# Patient Record
Sex: FEMALE | Race: BLACK OR AFRICAN AMERICAN | NOT HISPANIC OR LATINO | Employment: FULL TIME | URBAN - METROPOLITAN AREA
[De-identification: names, ages, dates, MRNs, and addresses within clinical notes are randomized per-mention and may not be internally consistent; named-entity substitution may affect disease eponyms.]

---

## 2020-04-05 ENCOUNTER — NURSE TRIAGE (OUTPATIENT)
Dept: OTHER | Facility: OTHER | Age: 25
End: 2020-04-05

## 2020-06-15 ENCOUNTER — HOSPITAL ENCOUNTER (EMERGENCY)
Facility: HOSPITAL | Age: 25
Discharge: HOME | End: 2020-06-15
Attending: EMERGENCY MEDICINE | Admitting: EMERGENCY MEDICINE
Payer: COMMERCIAL

## 2020-06-15 ENCOUNTER — APPOINTMENT (EMERGENCY)
Dept: RADIOLOGY | Facility: HOSPITAL | Age: 25
End: 2020-06-15
Payer: COMMERCIAL

## 2020-06-15 VITALS
BODY MASS INDEX: 30.27 KG/M2 | HEART RATE: 77 BPM | DIASTOLIC BLOOD PRESSURE: 77 MMHG | TEMPERATURE: 98.4 F | WEIGHT: 181.88 LBS | RESPIRATION RATE: 16 BRPM | SYSTOLIC BLOOD PRESSURE: 122 MMHG | OXYGEN SATURATION: 100 %

## 2020-06-15 DIAGNOSIS — S50.311A ABRASION OF RIGHT ELBOW, INITIAL ENCOUNTER: Primary | ICD-10-CM

## 2020-06-15 DIAGNOSIS — M25.521 RIGHT ELBOW PAIN: ICD-10-CM

## 2020-06-15 PROCEDURE — 90715 TDAP VACCINE 7 YRS/> IM: CPT | Performed by: EMERGENCY MEDICINE

## 2020-06-15 PROCEDURE — 90471 IMMUNIZATION ADMIN: CPT

## 2020-06-15 PROCEDURE — 99282 EMERGENCY DEPT VISIT SF MDM: CPT | Performed by: EMERGENCY MEDICINE

## 2020-06-15 PROCEDURE — 99283 EMERGENCY DEPT VISIT LOW MDM: CPT

## 2020-06-15 PROCEDURE — 73080 X-RAY EXAM OF ELBOW: CPT

## 2020-06-15 RX ORDER — IBUPROFEN 400 MG/1
400 TABLET ORAL ONCE
Status: COMPLETED | OUTPATIENT
Start: 2020-06-15 | End: 2020-06-15

## 2020-06-15 RX ADMIN — IBUPROFEN 400 MG: 400 TABLET ORAL at 18:21

## 2020-06-15 RX ADMIN — TETANUS TOXOID, REDUCED DIPHTHERIA TOXOID AND ACELLULAR PERTUSSIS VACCINE, ADSORBED 0.5 ML: 5; 2.5; 8; 8; 2.5 SUSPENSION INTRAMUSCULAR at 18:22

## 2020-07-31 ENCOUNTER — HOSPITAL ENCOUNTER (EMERGENCY)
Facility: HOSPITAL | Age: 25
Discharge: HOME | End: 2020-07-31
Attending: EMERGENCY MEDICINE | Admitting: EMERGENCY MEDICINE
Payer: COMMERCIAL

## 2020-07-31 ENCOUNTER — APPOINTMENT (EMERGENCY)
Dept: RADIOLOGY | Facility: HOSPITAL | Age: 25
End: 2020-07-31
Payer: COMMERCIAL

## 2020-07-31 VITALS
OXYGEN SATURATION: 100 % | HEART RATE: 74 BPM | DIASTOLIC BLOOD PRESSURE: 56 MMHG | SYSTOLIC BLOOD PRESSURE: 104 MMHG | WEIGHT: 180 LBS | RESPIRATION RATE: 16 BRPM | BODY MASS INDEX: 29.95 KG/M2 | TEMPERATURE: 98.8 F

## 2020-07-31 DIAGNOSIS — R30.0 DYSURIA: ICD-10-CM

## 2020-07-31 DIAGNOSIS — R33.9 URINARY RETENTION: Primary | ICD-10-CM

## 2020-07-31 LAB
ANION GAP SERPL CALCULATED.3IONS-SCNC: 7 MMOL/L (ref 4–13)
BACTERIA UR QL AUTO: ABNORMAL /HPF
BASOPHILS # BLD AUTO: 0.05 THOUSANDS/ΜL (ref 0–0.1)
BASOPHILS NFR BLD AUTO: 1 % (ref 0–1)
BILIRUB UR QL STRIP: NEGATIVE
BUN SERPL-MCNC: 12 MG/DL (ref 5–25)
CALCIUM SERPL-MCNC: 9.4 MG/DL (ref 8.3–10.1)
CHLORIDE SERPL-SCNC: 107 MMOL/L (ref 100–108)
CLARITY UR: CLEAR
CO2 SERPL-SCNC: 24 MMOL/L (ref 21–32)
COLOR UR: YELLOW
COLOR, POC: YELLOW
CREAT SERPL-MCNC: 0.64 MG/DL (ref 0.6–1.3)
EOSINOPHIL # BLD AUTO: 0.02 THOUSAND/ΜL (ref 0–0.61)
EOSINOPHIL NFR BLD AUTO: 0 % (ref 0–6)
ERYTHROCYTE [DISTWIDTH] IN BLOOD BY AUTOMATED COUNT: 14 % (ref 11.6–15.1)
EXT PREG TEST URINE: NEGATIVE
EXT. CONTROL ED NAV: NORMAL
GFR SERPL CREATININE-BSD FRML MDRD: 141 ML/MIN/1.73SQ M
GLUCOSE SERPL-MCNC: 88 MG/DL (ref 65–140)
GLUCOSE UR STRIP-MCNC: NEGATIVE MG/DL
HCT VFR BLD AUTO: 38.4 % (ref 34.8–46.1)
HGB BLD-MCNC: 12.7 G/DL (ref 11.5–15.4)
HGB UR QL STRIP.AUTO: NEGATIVE
HYALINE CASTS #/AREA URNS LPF: ABNORMAL /LPF
IMM GRANULOCYTES # BLD AUTO: 0.02 THOUSAND/UL (ref 0–0.2)
IMM GRANULOCYTES NFR BLD AUTO: 0 % (ref 0–2)
KETONES UR STRIP-MCNC: ABNORMAL MG/DL
LEUKOCYTE ESTERASE UR QL STRIP: NEGATIVE
LYMPHOCYTES # BLD AUTO: 1.98 THOUSANDS/ΜL (ref 0.6–4.47)
LYMPHOCYTES NFR BLD AUTO: 30 % (ref 14–44)
MCH RBC QN AUTO: 29.5 PG (ref 26.8–34.3)
MCHC RBC AUTO-ENTMCNC: 33.1 G/DL (ref 31.4–37.4)
MCV RBC AUTO: 89 FL (ref 82–98)
MONOCYTES # BLD AUTO: 0.7 THOUSAND/ΜL (ref 0.17–1.22)
MONOCYTES NFR BLD AUTO: 11 % (ref 4–12)
NEUTROPHILS # BLD AUTO: 3.75 THOUSANDS/ΜL (ref 1.85–7.62)
NEUTS SEG NFR BLD AUTO: 58 % (ref 43–75)
NITRITE UR QL STRIP: NEGATIVE
NON-SQ EPI CELLS URNS QL MICRO: ABNORMAL /HPF
NRBC BLD AUTO-RTO: 0 /100 WBCS
PH UR STRIP.AUTO: 6.5 [PH] (ref 4.5–8)
PLATELET # BLD AUTO: 225 THOUSANDS/UL (ref 149–390)
PMV BLD AUTO: 10.2 FL (ref 8.9–12.7)
POTASSIUM SERPL-SCNC: 3.6 MMOL/L (ref 3.5–5.3)
PROT UR STRIP-MCNC: ABNORMAL MG/DL
RBC # BLD AUTO: 4.31 MILLION/UL (ref 3.81–5.12)
RBC #/AREA URNS AUTO: ABNORMAL /HPF
SODIUM SERPL-SCNC: 138 MMOL/L (ref 136–145)
SP GR UR STRIP.AUTO: 1.02 (ref 1–1.03)
UROBILINOGEN UR QL STRIP.AUTO: 0.2 E.U./DL
WBC # BLD AUTO: 6.52 THOUSAND/UL (ref 4.31–10.16)
WBC #/AREA URNS AUTO: ABNORMAL /HPF

## 2020-07-31 PROCEDURE — 80048 BASIC METABOLIC PNL TOTAL CA: CPT | Performed by: EMERGENCY MEDICINE

## 2020-07-31 PROCEDURE — 99285 EMERGENCY DEPT VISIT HI MDM: CPT | Performed by: EMERGENCY MEDICINE

## 2020-07-31 PROCEDURE — 99284 EMERGENCY DEPT VISIT MOD MDM: CPT

## 2020-07-31 PROCEDURE — 87591 N.GONORRHOEAE DNA AMP PROB: CPT | Performed by: EMERGENCY MEDICINE

## 2020-07-31 PROCEDURE — 81001 URINALYSIS AUTO W/SCOPE: CPT

## 2020-07-31 PROCEDURE — 74176 CT ABD & PELVIS W/O CONTRAST: CPT

## 2020-07-31 PROCEDURE — 87491 CHLMYD TRACH DNA AMP PROBE: CPT | Performed by: EMERGENCY MEDICINE

## 2020-07-31 PROCEDURE — 85025 COMPLETE CBC W/AUTO DIFF WBC: CPT | Performed by: EMERGENCY MEDICINE

## 2020-07-31 PROCEDURE — 96361 HYDRATE IV INFUSION ADD-ON: CPT

## 2020-07-31 PROCEDURE — 96360 HYDRATION IV INFUSION INIT: CPT

## 2020-07-31 PROCEDURE — 76775 US EXAM ABDO BACK WALL LIM: CPT | Performed by: EMERGENCY MEDICINE

## 2020-07-31 PROCEDURE — 36415 COLL VENOUS BLD VENIPUNCTURE: CPT | Performed by: EMERGENCY MEDICINE

## 2020-07-31 PROCEDURE — 96372 THER/PROPH/DIAG INJ SC/IM: CPT

## 2020-07-31 PROCEDURE — 51798 US URINE CAPACITY MEASURE: CPT

## 2020-07-31 PROCEDURE — 81025 URINE PREGNANCY TEST: CPT | Performed by: EMERGENCY MEDICINE

## 2020-07-31 RX ORDER — AZITHROMYCIN 500 MG/1
500 TABLET, FILM COATED ORAL ONCE
Status: COMPLETED | OUTPATIENT
Start: 2020-07-31 | End: 2020-07-31

## 2020-07-31 RX ORDER — IBUPROFEN 400 MG/1
400 TABLET ORAL ONCE
Status: COMPLETED | OUTPATIENT
Start: 2020-07-31 | End: 2020-07-31

## 2020-07-31 RX ORDER — PHENAZOPYRIDINE HYDROCHLORIDE 100 MG/1
100 TABLET, FILM COATED ORAL ONCE
Status: COMPLETED | OUTPATIENT
Start: 2020-07-31 | End: 2020-07-31

## 2020-07-31 RX ORDER — ACYCLOVIR 400 MG/1
400 TABLET ORAL EVERY 8 HOURS
Qty: 30 TABLET | Refills: 0 | Status: SHIPPED | OUTPATIENT
Start: 2020-07-31 | End: 2021-07-05

## 2020-07-31 RX ORDER — PHENAZOPYRIDINE HYDROCHLORIDE 200 MG/1
200 TABLET, FILM COATED ORAL 3 TIMES DAILY
Qty: 6 TABLET | Refills: 0 | Status: SHIPPED | OUTPATIENT
Start: 2020-07-31 | End: 2021-07-05

## 2020-07-31 RX ADMIN — PHENAZOPYRIDINE HYDROCHLORIDE 100 MG: 100 TABLET ORAL at 15:49

## 2020-07-31 RX ADMIN — IBUPROFEN 400 MG: 400 TABLET ORAL at 07:31

## 2020-07-31 RX ADMIN — AZITHROMYCIN 500 MG: 500 TABLET, FILM COATED ORAL at 16:15

## 2020-07-31 RX ADMIN — SODIUM CHLORIDE 2000 ML: 0.9 INJECTION, SOLUTION INTRAVENOUS at 12:14

## 2020-07-31 RX ADMIN — AZITHROMYCIN 500 MG: 500 TABLET, FILM COATED ORAL at 15:49

## 2020-07-31 RX ADMIN — LIDOCAINE HYDROCHLORIDE 250 MG: 10 INJECTION, SOLUTION EPIDURAL; INFILTRATION; INTRACAUDAL; PERINEURAL at 15:48

## 2020-07-31 NOTE — ED NOTES
Patient unable to void  Bladder scan shows 176ml of urine in bladder       Jhony Morales RN  07/31/20 3020

## 2020-07-31 NOTE — ED ATTENDING ATTESTATION
7/31/2020  ILizette MD, saw and evaluated the patient  I have discussed the patient with the resident/non-physician practitioner and agree with the resident's/non-physician practitioner's findings, Plan of Care, and MDM as documented in the resident's/non-physician practitioner's note, except where noted  All available labs and Radiology studies were reviewed  I was present for key portions of any procedure(s) performed by the resident/non-physician practitioner and I was immediately available to provide assistance  At this point I agree with the current assessment done in the Emergency Department  I have conducted an independent evaluation of this patient a history and physical is as follows:    Patient is a 68-year-old female who presents with acute urinary retention onset this morning  Denies any back or abdominal pain at this time  States that she is unable to initiate voiding has day urge to urinate but cannot void  Neurologic exam is within normal limits  Patient initially stated that she did not have any vaginal bleeding or discharge  However upon straight cathing patient nursing noted that she did have vaginal discharge present upon perineum  Patient denies any STI exposures  A pelvic exam is performed by the resident swab sent for GC chlamydia  Attempted a foil trial emergency department was unsuccessful secondary to patient unable to void patient was re-evaluated when directly asked patient states it hurts to void  Patient was empirically covered for gonorrhea and chlamydia as well as have herpes  Patient also underwent labs urinalysis and imaging well emergency department all which were unremarkable  Patient was offered a Urrutia catheter with leg bag however declined and will follow-up with urology as an outpatient  Return precautions discussed with patient to return immediately should she not be able to void within next 4-6 hours    Should she develop fevers or should she have any other further concerns    ED Course         Critical Care Time  Procedures

## 2020-07-31 NOTE — ED PROVIDER NOTES
History  Chief Complaint   Patient presents with    Urinary Retention     Pt stated that beginning yesterday she was having trouble urinating and has now not urinated since 2300 hours 7/30/2020  Complaints of lower abdominal pain  HPI   64-year-old woman presenting with urinary retention  Over the past 4 days patient has had dysuria and sensation of difficulty emptying her bladder completely  This began with urinary hesitancy and frequency  She thought her urine initially was cloudy and possibly different smelling when symptoms began  She was able to urinate spontaneously at first but has felt like she was never completely emptying her bladder  This morning she tried to urinate and was unable to expell any urine at all  She has some suprapubic discomfort  She denies any flank or back pain  No weakness or numbness in legs or groin  She denies feeling febrile  No nausea or vomiting  She has a history of UTI, but says this feels different  No history of nephrolithiasis  Denies any vaginal bleeding or discharge  Monogamous and no concern for sexually transmitted infection  None       History reviewed  No pertinent past medical history  History reviewed  No pertinent surgical history  History reviewed  No pertinent family history  I have reviewed and agree with the history as documented  E-Cigarette/Vaping    E-Cigarette Use Never User      E-Cigarette/Vaping Substances     Social History     Tobacco Use    Smoking status: Never Smoker   Substance Use Topics    Alcohol use: No    Drug use: No        Review of Systems   Constitutional: Negative for chills and fever  Respiratory: Negative for cough and shortness of breath  Cardiovascular: Negative for chest pain  Gastrointestinal: Negative for abdominal pain, nausea and vomiting  Genitourinary: Positive for difficulty urinating, dysuria, frequency and pelvic pain   Negative for flank pain, hematuria, vaginal bleeding, vaginal discharge and vaginal pain  Musculoskeletal: Negative for arthralgias, back pain and myalgias  Skin: Negative for pallor and rash  Neurological: Negative for weakness, numbness and headaches  All other systems reviewed and are negative  Physical Exam  ED Triage Vitals   Temperature Pulse Respirations Blood Pressure SpO2   07/31/20 0622 07/31/20 0622 07/31/20 0622 07/31/20 0622 07/31/20 0622   98 8 °F (37 1 °C) 95 18 124/74 95 %      Temp Source Heart Rate Source Patient Position - Orthostatic VS BP Location FiO2 (%)   07/31/20 0622 07/31/20 1302 07/31/20 1302 07/31/20 1302 --   Oral Monitor Lying Right arm       Pain Score       07/31/20 0622       8             Orthostatic Vital Signs  Vitals:    07/31/20 1302 07/31/20 1330 07/31/20 1400 07/31/20 1530   BP: 100/62 101/56 99/58 104/56   Pulse: 65 74 64 74   Patient Position - Orthostatic VS: Lying          Physical Exam  Vitals signs and nursing note reviewed  Constitutional:       General: She is not in acute distress  Appearance: She is well-developed and well-nourished  She is not diaphoretic  HENT:      Head: Normocephalic and atraumatic  Eyes:      General: No scleral icterus  Extraocular Movements: EOM normal       Conjunctiva/sclera: Conjunctivae normal       Pupils: Pupils are equal, round, and reactive to light  Neck:      Musculoskeletal: Normal range of motion and neck supple  Cardiovascular:      Rate and Rhythm: Normal rate and regular rhythm  Heart sounds: No murmur  No friction rub  No gallop  Pulmonary:      Breath sounds: Normal breath sounds  No wheezing or rales  Abdominal:      General: There is no distension  Palpations: Abdomen is soft  Tenderness: There is no abdominal tenderness  There is no guarding or rebound  Comments: Mild suprapubic discomfort to palpation  Genitourinary:     Vagina: Vaginal discharge present  Comments: No flank tenderness to percussion    Chaperoned pelvic exam   No external or intravaginal lesions seen  Cervix appears non-inflamed  No CMT  Musculoskeletal: Normal range of motion  General: No tenderness or edema  Comments: No back pain  Skin:     General: Skin is warm and dry  Coloration: Skin is not pale  Findings: No erythema  Neurological:      Mental Status: She is alert and oriented to person, place, and time  Cranial Nerves: No cranial nerve deficit  Sensory: No sensory deficit  Motor: No abnormal muscle tone  Comments: No groin paresthesias  No numbness or weakness in legs  Normal gait  Psychiatric:         Mood and Affect: Mood and affect normal          Behavior: Behavior normal          ED Medications  Medications   ibuprofen (MOTRIN) tablet 400 mg (400 mg Oral Given 7/31/20 0731)   sodium chloride 0 9 % bolus 2,000 mL (0 mL Intravenous Stopped 7/31/20 1419)   phenazopyridine (PYRIDIUM) tablet 100 mg (100 mg Oral Given 7/31/20 1549)   cefTRIAXone (ROCEPHIN) 250 mg in lidocaine (PF) (XYLOCAINE-MPF) 1 % IM only syringe (250 mg Intramuscular Given 7/31/20 1548)   azithromycin (ZITHROMAX) tablet 500 mg (500 mg Oral Given 7/31/20 1549)   azithromycin (ZITHROMAX) tablet 500 mg (500 mg Oral Given 7/31/20 1615)       Diagnostic Studies  Results Reviewed     Procedure Component Value Units Date/Time    Chlamydia/GC amplified DNA by PCR [[id number]] Collected:  07/31/20 1553    Lab Status:   In process Specimen:  Genital from Cervix Updated:  07/31/20 1601    Urine Microscopic [[id number]]  (Abnormal) Collected:  07/31/20 0740    Lab Status:  Final result Specimen:  Urine, Other Updated:  07/31/20 0840     RBC, UA None Seen /hpf      WBC, UA 2-4 /hpf      Epithelial Cells None Seen /hpf      Bacteria, UA None Seen /hpf      Hyaline Casts, UA None Seen /lpf     Basic metabolic panel [[id number]] Collected:  07/31/20 0727    Lab Status:  Final result Specimen:  Blood from Arm, Left Updated:  07/31/20 0747     Sodium 138 mmol/L      Potassium 3 6 mmol/L      Chloride 107 mmol/L      CO2 24 mmol/L      ANION GAP 7 mmol/L      BUN 12 mg/dL      Creatinine 0 64 mg/dL      Glucose 88 mg/dL      Calcium 9 4 mg/dL      eGFR 141 ml/min/1 73sq m     Narrative:       National Kidney Disease Foundation guidelines for Chronic Kidney Disease (CKD):     Stage 1 with normal or high GFR (GFR > 90 mL/min/1 73 square meters)    Stage 2 Mild CKD (GFR = 60-89 mL/min/1 73 square meters)    Stage 3A Moderate CKD (GFR = 45-59 mL/min/1 73 square meters)    Stage 3B Moderate CKD (GFR = 30-44 mL/min/1 73 square meters)    Stage 4 Severe CKD (GFR = 15-29 mL/min/1 73 square meters)    Stage 5 End Stage CKD (GFR <15 mL/min/1 73 square meters)  Note: GFR calculation is accurate only with a steady state creatinine    POCT pregnancy, urine [[id number]]  (Normal) Resulted:  07/31/20 0740    Lab Status:  Final result Updated:  07/31/20 0740     EXT PREG TEST UR (Ref: Negative) negative     Control valid    Urine Macroscopic, POC [[id number]]  (Abnormal) Collected:  07/31/20 0740    Lab Status:  Final result Specimen:  Urine Updated:  07/31/20 0739     Color, UA Yellow     Clarity, UA Clear     pH, UA 6 5     Leukocytes, UA Negative     Nitrite, UA Negative     Protein, UA Trace mg/dl      Glucose, UA Negative mg/dl      Ketones, UA Trace mg/dl      Urobilinogen, UA 0 2 E U /dl      Bilirubin, UA Negative     Blood, UA Negative     Specific Gravity, UA 1 025    Narrative:       CLINITEK RESULT    CBC and differential [[id number]] Collected:  07/31/20 0727    Lab Status:  Final result Specimen:  Blood from Arm, Left Updated:  07/31/20 0738     WBC 6 52 Thousand/uL      RBC 4 31 Million/uL      Hemoglobin 12 7 g/dL      Hematocrit 38 4 %      MCV 89 fL      MCH 29 5 pg      MCHC 33 1 g/dL      RDW 14 0 %      MPV 10 2 fL      Platelets 611 Thousands/uL      nRBC 0 /100 WBCs      Neutrophils Relative 58 %      Immat GRANS % 0 %      Lymphocytes Relative 30 % Monocytes Relative 11 %      Eosinophils Relative 0 %      Basophils Relative 1 %      Neutrophils Absolute 3 75 Thousands/µL      Immature Grans Absolute 0 02 Thousand/uL      Lymphocytes Absolute 1 98 Thousands/µL      Monocytes Absolute 0 70 Thousand/µL      Eosinophils Absolute 0 02 Thousand/µL      Basophils Absolute 0 05 Thousands/µL     POCT urinalysis dipstick [[id number]]  (Normal) Resulted:  07/31/20 0727    Lab Status:  Final result Specimen:  Urine Updated:  07/31/20 0727     Color, UA yellow                 CT renal stone study abdomen pelvis wo contrast   Final Result by Elmo Jay MD (07/31 8810)      No evidence for hydronephrosis or obstructive uropathy  Symmetric high attenuation within the renal prominence bilaterally without evidence for obstructive uropathy  Finding can be seen in the setting of increased urine specific gravity and/or dehydration  No definite evidence for nephrolithiasis or focal    calcification to suggest presence of underlying medullary nephrocalcinosis on the current exam       Appendicolith within the appendiceal tip  No definite right lower quadrant inflammatory changes on the current exam       Small fat-containing umbilical hernia               Workstation performed: [id number]               Procedures  POC Renal US  Date/Time: 7/31/2020 7:07 AM  Performed by: Chidi Morrow MD  Authorized by: Chidi Morrow MD     Patient location:  ED  Performed by:  Resident  Other Assisting Provider: Yes (comment) Obi Lincoln MD)    Procedure details:     Exam Type:  Diagnostic    Indications: urinary retention      Assessment for:  Bladder volume and suspected hydronephrosis    Views obtained: bladder (transverse and sagittal), left kidney and right kidney      Image quality: diagnostic      Image availability:  Images available in PACS and still images obtained  Findings:     LEFT hydronephrosis: mild (grade 1)      RIGHT hydronephrosis: none      Bladder: Visualized    Bladder findings: distended bladder    Interpretation:     Renal ultrasound impressions: hydronephrosis            ED Course  ED Course as of Aug 02 0857   Fri Jul 31, 2020   0759 PREGNANCY TEST URINE: negative   0759 WBC: 6 52   0759 Creatinine: 0 64   0759 Leukocytes, UA: Negative   0759 Nitrite, UA: Negative   0759 Patient's labs show no leukocytosis, no acute kidney injury, urine appears clean on macro  CT renal stone study is pending  0914 WBC, UA(!): 2-4       US AUDIT      Most Recent Value   Initial Alcohol Screen: US AUDIT-C    1  How often do you have a drink containing alcohol? 2 Filed at: 07/31/2020 0731   2  How many drinks containing alcohol do you have on a typical day you are drinking? 0 Filed at: 07/31/2020 0731   3a  Male UNDER 65: How often do you have five or more drinks on one occasion? 0 Filed at: 07/31/2020 0731   3b  FEMALE Any Age, or MALE 65+: How often do you have 4 or more drinks on one occassion? 0 Filed at: 07/31/2020 0731   Audit-C Score  2 Filed at: 07/31/2020 5175        MDM  Number of Diagnoses or Management Options  Dysuria: new and requires workup  Urinary retention: new and requires workup     Amount and/or Complexity of Data Reviewed  Clinical lab tests: ordered and reviewed  Tests in the radiology section of CPT®: ordered and reviewed  Tests in the medicine section of CPT®: ordered and reviewed  Discussion of test results with the performing providers: yes  Independent visualization of images, tracings, or specimens: yes    Patient Progress  Patient progress: improved    73-year-old woman here with dysuria and urinary retention  Patient is well-appearing with normal vital signs  She does have some suprapubic distention with prevoid bladder volume greater than 380 mL with bladder scanner  On bedside ultrasound patient appears to have some mild hydronephrosis of the left kidney  The bladder is full  Will check urinalysis, straight cath if necessary  Given hydronephrosis on bedside ultrasound will do CT stone study to evaluate for nephrolithiasis that could be causing obstruction  Will check BMP for renal function  Will provide analgesia with NSAIDs  Patient's urine is clean  Her renal function is normal   No nephrolithiasis or hydronephrosis seen on CT abdomen/pelvis  The bladder was completely voided with straight catheterization  The patient was only able to void spontaneously a very small amount of urine afterward despite 2 L IV fluids  Postvoid residual was greater than 150 mL  Patient denies any vaginal discharge or concern for sexually transmitted infection, but given no alternative explanation for her urinary retention I recommended pelvic exam for screening for sexually transmitted infection since I am suspicious her urinary retention is related to urethral pain  On exam the patient does have a moderate amount of whitish gray discharge  Will send for gonorrhea and chlamydia testing and treat empirically  Her partner will also get testing  Herpes simplex virus could also cause patient's dysuria  No external lesions of herpes were seen on exam, but will treat empirically with 10 day course of acyclovir  Will provide patient with Pyridium for dysuria  Initial plan was to discharge patient with Urrutia catheter in place, but patient was unable to tolerate having the catheter in because of discomfort  Offered patient admission to the hospital for intermittent straight catheterization and urology evaluation, but she would like to be discharged with the understanding that she may need to return to the hospital if she is unable to void spontaneously at home  I discussed the patient with urology KARLOS Roque, who will notify Urology Clinic of patient for follow-up      Disposition  Final diagnoses:   Urinary retention   Dysuria     Time reflects when diagnosis was documented in both MDM as applicable and the Disposition within this note     Time User Action Codes Description Comment    7/31/2020  3:59 PM Bettyjane Celia Add [R33 9] Urinary retention     7/31/2020  4:07 PM Bettyjane Celia Add [R30 0] Dysuria       ED Disposition     ED Disposition Condition Date/Time Comment    Discharge Fair Fri Jul 31, 2020  3:59 PM Liliana Bahena discharge to home/self care  Follow-up Information     Follow up With Specialties Details Why Contact Info Additional [address] [facility] Call  For follow-up appointment [address] [facility] [id number]  704  [facility] [place], [address] [place] [address], [address]    [facility] Emergency Medicine Go to  If symptoms worsen [address]  [phone]  ED, [address] [place] [address], [address]   [phone]          Discharge Medication List as of 7/31/2020  4:12 PM      START taking these medications    Details   acyclovir (ZOVIRAX) 400 MG tablet Take 1 tablet (400 mg total) by mouth every 8 (eight) hours for 10 days, Starting Fri 7/31/2020, Until Mon 8/10/2020, Print      phenazopyridine (PYRIDIUM) 200 mg tablet Take 1 tablet (200 mg total) by mouth 3 (three) times a day, Starting Fri 7/31/2020, Print           No discharge procedures on file  PDMP Review     None           ED Provider  Attending physically available and evaluated Liliana Bahena I managed the patient along with the ED Attending      Electronically Signed by         Charity Dos Santos MD  08/02/20 95 Lupe Perez MD  08/02/20 95 Lupe Perez MD  08/02/20 3776

## 2020-08-04 LAB
C TRACH DNA SPEC QL NAA+PROBE: NEGATIVE
N GONORRHOEA DNA SPEC QL NAA+PROBE: NEGATIVE

## 2020-09-30 ENCOUNTER — NURSE TRIAGE (OUTPATIENT)
Dept: OTHER | Facility: OTHER | Age: 25
End: 2020-09-30

## 2020-09-30 DIAGNOSIS — Z20.822 COVID-19 RULED OUT: Primary | ICD-10-CM

## 2020-10-01 DIAGNOSIS — Z20.822 COVID-19 RULED OUT: ICD-10-CM

## 2020-10-01 PROCEDURE — U0003 INFECTIOUS AGENT DETECTION BY NUCLEIC ACID (DNA OR RNA); SEVERE ACUTE RESPIRATORY SYNDROME CORONAVIRUS 2 (SARS-COV-2) (CORONAVIRUS DISEASE [COVID-19]), AMPLIFIED PROBE TECHNIQUE, MAKING USE OF HIGH THROUGHPUT TECHNOLOGIES AS DESCRIBED BY CMS-2020-01-R: HCPCS | Performed by: FAMILY MEDICINE

## 2020-10-01 NOTE — TELEPHONE ENCOUNTER
Regarding: COVID-19 test  ----- Message from Aramis Etienne sent at 9/30/2020  9:44 PM EDT -----  My temperature is 100 0 (Oral), stuffy and runny nose, weak and sore throat  I will like to get tested for COVID-19

## 2020-10-01 NOTE — TELEPHONE ENCOUNTER
Reason for Disposition   COVID-19 Testing, questions about    Answer Assessment - Initial Assessment Questions  1  COVID-19 DIAGNOSIS: "Who made your Coronavirus (COVID-19) diagnosis?" "Was it confirmed by a positive lab test?" If not diagnosed by a HCP, ask "Are there lots of cases (community spread) where you live?" (See public health department website, if unsure)    * MAJOR community spread: high number of cases; numbers of cases are increasing; many people hospitalized  * MINOR community spread: low number of cases; not increasing; few or no people hospitalized      Sunday at movies  2  ONSET: "When did the COVID-19 symptoms start?"       Tuesday  3  WORST SYMPTOM: "What is your worst symptom?" (e g , cough, fever, shortness of breath, muscle aches)      Worse  4  COUGH: "Do you have a cough?" If so, ask: "How bad is the cough?"        Cough  5  FEVER: "Do you have a fever?" If so, ask: "What is your temperature, how was it measured, and when did it start?"      100 0  6  RESPIRATORY STATUS: "Describe your breathing?" (e g , shortness of breath, wheezing, unable to speak)       SOB  7  BETTER-SAME-WORSE: "Are you getting better, staying the same or getting worse compared to yesterday?"  If getting worse, ask, "In what way?"      Worse  8  HIGH RISK DISEASE: "Do you have any chronic medical problems?" (e g , asthma, heart or lung disease, weak immune system, etc )      No  9  PREGNANCY: "Is there any chance you are pregnant?" "When was your last menstrual period?"      Sunday  10   OTHER SYMPTOMS: "Do you have any other symptoms?"  (e g , runny nose, headache, sore throat, loss of smell)        Weakness, sore throat    Protocols used: CORONAVIRUS (COVID-19) - DIAGNOSED OR SUSPECTED-ADULT-

## 2020-10-03 LAB — SARS-COV-2 RNA SPEC QL NAA+PROBE: NOT DETECTED

## 2021-07-05 ENCOUNTER — HOSPITAL ENCOUNTER (EMERGENCY)
Facility: HOSPITAL | Age: 26
Discharge: HOME | End: 2021-07-05
Attending: EMERGENCY MEDICINE
Payer: COMMERCIAL

## 2021-07-05 VITALS
TEMPERATURE: 98.2 F | HEART RATE: 78 BPM | SYSTOLIC BLOOD PRESSURE: 107 MMHG | DIASTOLIC BLOOD PRESSURE: 66 MMHG | WEIGHT: 180.34 LBS | RESPIRATION RATE: 16 BRPM | BODY MASS INDEX: 30.01 KG/M2 | OXYGEN SATURATION: 100 %

## 2021-07-05 DIAGNOSIS — M62.830 MUSCLE SPASM OF BACK: Primary | ICD-10-CM

## 2021-07-05 PROCEDURE — 99284 EMERGENCY DEPT VISIT MOD MDM: CPT | Performed by: EMERGENCY MEDICINE

## 2021-07-05 PROCEDURE — 99283 EMERGENCY DEPT VISIT LOW MDM: CPT

## 2021-07-05 PROCEDURE — 96372 THER/PROPH/DIAG INJ SC/IM: CPT

## 2021-07-05 RX ORDER — IBUPROFEN 600 MG/1
600 TABLET ORAL EVERY 6 HOURS PRN
Qty: 60 TABLET | Refills: 0 | Status: SHIPPED | OUTPATIENT
Start: 2021-07-05 | End: 2022-07-01

## 2021-07-05 RX ORDER — LIDOCAINE 50 MG/G
1 PATCH TOPICAL ONCE
Status: DISCONTINUED | OUTPATIENT
Start: 2021-07-05 | End: 2021-07-05 | Stop reason: HOSPADM

## 2021-07-05 RX ORDER — KETOROLAC TROMETHAMINE 30 MG/ML
15 INJECTION, SOLUTION INTRAMUSCULAR; INTRAVENOUS ONCE
Status: COMPLETED | OUTPATIENT
Start: 2021-07-05 | End: 2021-07-05

## 2021-07-05 RX ORDER — DIAZEPAM 5 MG/ML
5 INJECTION, SOLUTION INTRAMUSCULAR; INTRAVENOUS ONCE
Status: COMPLETED | OUTPATIENT
Start: 2021-07-05 | End: 2021-07-05

## 2021-07-05 RX ORDER — METHOCARBAMOL 750 MG/1
1500 TABLET, FILM COATED ORAL EVERY 8 HOURS SCHEDULED
Qty: 42 TABLET | Refills: 0 | Status: SHIPPED | OUTPATIENT
Start: 2021-07-05 | End: 2021-07-12

## 2021-07-05 RX ADMIN — LIDOCAINE 1 PATCH: 50 PATCH CUTANEOUS at 19:49

## 2021-07-05 RX ADMIN — KETOROLAC TROMETHAMINE 15 MG: 30 INJECTION, SOLUTION INTRAMUSCULAR; INTRAVENOUS at 19:49

## 2021-07-05 RX ADMIN — DIAZEPAM 5 MG: 10 INJECTION, SOLUTION INTRAMUSCULAR; INTRAVENOUS at 19:49

## 2021-07-05 NOTE — ED PROVIDER NOTES
History  Chief Complaint   Patient presents with    Back Pain     Patient states the pain started yesterday she was picking something up and felt a pain in lower left back  Patient says the pain subsided but when she tried turning today she was unable due to pain  Pt is a 30year old female presenting with left back pain x 1 day  Pt states that she had noticed left thoracolumbar back pain yesterday at work, but the pain was manageable  States today she had sudden worsening of the left back and was not able to move due to stiffness  Pt states now she has constant pain which is exacerbated by twisting and movement  She has not taken any medications for the pain  No prior back injuries  Denies fevers, loss of bowel or bladder, saddle anesthesia, IV drug abuse  History provided by:  Patient   used: No    Back Pain  Location:  Thoracic spine  Radiates to:  Does not radiate  Pain severity:  Severe  Pain is:  Same all the time  Onset quality:  Gradual  Duration:  1 day  Timing:  Constant  Progression:  Worsening  Chronicity:  New  Context: occupational injury    Relieved by:  Nothing  Worsened by:  Palpation, twisting, bending and movement  Ineffective treatments:  None tried  Risk factors: no hx of cancer, not pregnant, no recent surgery, no steroid use and no vascular disease        Prior to Admission Medications   Prescriptions Last Dose Informant Patient Reported? Taking?   acyclovir (ZOVIRAX) 400 MG tablet   No No   Sig: Take 1 tablet (400 mg total) by mouth every 8 (eight) hours for 10 days   phenazopyridine (PYRIDIUM) 200 mg tablet   No No   Sig: Take 1 tablet (200 mg total) by mouth 3 (three) times a day      Facility-Administered Medications: None       History reviewed  No pertinent past medical history  History reviewed  No pertinent surgical history  History reviewed  No pertinent family history    I have reviewed and agree with the history as documented  E-Cigarette/Vaping    E-Cigarette Use Never User      E-Cigarette/Vaping Substances     Social History     Tobacco Use    Smoking status: Never Smoker    Smokeless tobacco: Never Used   Vaping Use    Vaping Use: Never used   Substance Use Topics    Alcohol use: Yes     Comment: occassionally    Drug use: No       Review of Systems   Constitutional: Negative  HENT: Negative  Respiratory: Negative  Cardiovascular: Negative  Gastrointestinal: Negative  Genitourinary: Negative  Musculoskeletal: Positive for back pain  Neurological: Negative  All other systems reviewed and are negative  Physical Exam  Physical Exam  Constitutional:       General: She is not in acute distress  Appearance: She is well-developed  She is not diaphoretic  HENT:      Head: Normocephalic and atraumatic  Right Ear: External ear normal       Left Ear: External ear normal       Nose: Nose normal    Eyes:      General: No scleral icterus  Right eye: No discharge  Left eye: No discharge  Extraocular Movements: Extraocular movements intact  Conjunctiva/sclera: Conjunctivae normal    Cardiovascular:      Rate and Rhythm: Normal rate and regular rhythm  Pulses:           Dorsalis pedis pulses are 2+ on the right side and 2+ on the left side  Heart sounds: Normal heart sounds  Pulmonary:      Effort: Pulmonary effort is normal       Breath sounds: Normal breath sounds  Musculoskeletal:      Cervical back: Normal, normal range of motion and neck supple  Thoracic back: Spasms and tenderness present  No swelling, edema, deformity, signs of trauma, lacerations or bony tenderness  Decreased range of motion  No scoliosis        Lumbar back: Normal         Back:       Right hip: Normal       Left hip: Normal       Right knee: Normal       Left knee: Normal       Right ankle: Normal       Left ankle: Normal       Comments: LE strength 5/5 b/l and neurovascularly in tact distally on exam    Skin:     General: Skin is warm and dry  Neurological:      General: No focal deficit present  Mental Status: She is alert and oriented to person, place, and time  Mental status is at baseline  Psychiatric:         Mood and Affect: Mood normal          Behavior: Behavior normal          Vital Signs  ED Triage Vitals [07/05/21 1837]   Temperature Pulse Respirations Blood Pressure SpO2   98 2 °F (36 8 °C) 78 16 107/66 100 %      Temp Source Heart Rate Source Patient Position - Orthostatic VS BP Location FiO2 (%)   Oral Monitor -- -- --      Pain Score       8           Vitals:    07/05/21 1837   BP: 107/66   Pulse: 78         Visual Acuity      ED Medications  Medications   lidocaine (LIDODERM) 5 % patch 1 patch (1 patch Topical Medication Applied 7/5/21 1949)   diazepam (VALIUM) injection 5 mg (5 mg Intramuscular Given 7/5/21 1949)   ketorolac (TORADOL) injection 15 mg (15 mg Intramuscular Given 7/5/21 1949)       Diagnostic Studies  Results Reviewed     None                 No orders to display              Procedures  Procedures         ED Course                                           MDM  Number of Diagnoses or Management Options  Muscle spasm of back: new and does not require workup  Risk of Complications, Morbidity, and/or Mortality  Presenting problems: high  Management options: high  General comments: Given IM Valium in the Ed for symptoms  Patient Progress  Patient progress: stable      Disposition  Final diagnoses:   Muscle spasm of back     Time reflects when diagnosis was documented in both MDM as applicable and the Disposition within this note     Time User Action Codes Description Comment    7/5/2021  7:41 PM De Reason Add [P49 201] Muscle spasm of back       ED Disposition     ED Disposition Condition Date/Time Comment    Discharge Good Mon Jul 5, 2021  7:41 PM Kunal Flores discharge to home/self care              Follow-up Information Follow up With Specialties Details Why Contact Info Additional [address] [facility] Schedule an appointment as soon as possible for a visit  As needed [address], [address] [facility] [address]  [address], [address], [address] [address], [address]          Patient's Medications   Discharge Prescriptions    DICLOFENAC SODIUM (VOLTAREN) 1 %    Apply 2 g topically 4 (four) times a day       Start Date: 7/5/2021  End Date: --       Order Dose: 2 g       Quantity: 350 g    Refills: 0    IBUPROFEN (MOTRIN) 600 MG TABLET    Take 1 tablet (600 mg total) by mouth every 6 (six) hours as needed for mild pain       Start Date: 7/5/2021  End Date: --       Order Dose: 600 mg       Quantity: 60 tablet    Refills: 0    METHOCARBAMOL (ROBAXIN) 750 MG TABLET    Take 2 tablets (1,500 mg total) by mouth every 8 (eight) hours for 7 days       Start Date: 7/5/2021  End Date: 7/12/2021       Order Dose: 1,500 mg       Quantity: 42 tablet    Refills: 0     No discharge procedures on file      PDMP Review     None          ED Provider  Electronically Signed by           Vero Scott PA-C  07/05/21 1953

## 2021-07-05 NOTE — Clinical Note
Jodie Schuler was seen and treated in our emergency department on 7/5/2021  Diagnosis:     Alisson    She may return on this date: 07/08/2021         If you have any questions or concerns, please don't hesitate to call        Siena Cruz PA-C    ______________________________           _______________          _______________  Hospital Representative                              Date                                Time

## 2021-07-05 NOTE — Clinical Note
Sophia Cabezas was seen and treated in our emergency department on 7/5/2021  Diagnosis:     Alisson    She may return on this date: 07/08/2021         If you have any questions or concerns, please don't hesitate to call        Imani Garcia PA-C    ______________________________           _______________          _______________  Hospital Representative                              Date                                Time

## 2022-06-23 ENCOUNTER — APPOINTMENT (EMERGENCY)
Dept: RADIOLOGY | Facility: HOSPITAL | Age: 27
End: 2022-06-23
Payer: COMMERCIAL

## 2022-06-23 ENCOUNTER — HOSPITAL ENCOUNTER (EMERGENCY)
Facility: HOSPITAL | Age: 27
Discharge: HOME | End: 2022-06-23
Attending: EMERGENCY MEDICINE | Admitting: EMERGENCY MEDICINE
Payer: COMMERCIAL

## 2022-06-23 VITALS
HEART RATE: 74 BPM | TEMPERATURE: 98.7 F | SYSTOLIC BLOOD PRESSURE: 116 MMHG | OXYGEN SATURATION: 99 % | RESPIRATION RATE: 17 BRPM | BODY MASS INDEX: 30.01 KG/M2 | WEIGHT: 180.34 LBS | DIASTOLIC BLOOD PRESSURE: 79 MMHG
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
## 2022-06-23 DIAGNOSIS — M79.642 LEFT HAND PAIN: ICD-10-CM

## 2022-06-23 LAB
EXT PREG TEST URINE: NEGATIVE
EXT. CONTROL ED NAV: NORMAL

## 2022-06-23 PROCEDURE — 96372 THER/PROPH/DIAG INJ SC/IM: CPT

## 2022-06-23 PROCEDURE — 81025 URINE PREGNANCY TEST: CPT

## 2022-06-23 PROCEDURE — 99284 EMERGENCY DEPT VISIT MOD MDM: CPT

## 2022-06-23 PROCEDURE — 73130 X-RAY EXAM OF HAND: CPT

## 2022-06-23 RX ORDER — ACETAMINOPHEN 325 MG/1
650 TABLET ORAL EVERY 6 HOURS PRN
Qty: 30 TABLET | Refills: 0 | Status: SHIPPED | OUTPATIENT
Start: 2022-06-23

## 2022-06-23 RX ORDER — KETOROLAC TROMETHAMINE 30 MG/ML
30 INJECTION, SOLUTION INTRAMUSCULAR; INTRAVENOUS ONCE
Status: COMPLETED | OUTPATIENT
Start: 2022-06-23 | End: 2022-06-23

## 2022-06-23 RX ORDER — ACETAMINOPHEN 325 MG/1
975 TABLET ORAL ONCE
Status: COMPLETED | OUTPATIENT
Start: 2022-06-23 | End: 2022-06-23

## 2022-06-23 RX ORDER — IBUPROFEN 600 MG/1
600 TABLET ORAL EVERY 6 HOURS PRN
Qty: 30 TABLET | Refills: 0 | Status: SHIPPED | OUTPATIENT
Start: 2022-06-23 | End: 2022-07-01

## 2022-06-23 RX ADMIN — KETOROLAC TROMETHAMINE 30 MG: 30 INJECTION, SOLUTION INTRAMUSCULAR; INTRAVENOUS at 17:24

## 2022-06-23 RX ADMIN — ACETAMINOPHEN 325MG 975 MG: 325 TABLET ORAL at 17:17
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
## 2022-06-23 NOTE — Clinical Note
Republicron Blanco was seen and treated in our emergency department on 6/23/2022  Light Duty until cleared by primary care doctor or occupational medicine    Diagnosis:     Joe Samuel    She may return on this date: 06/27/2022         If you have any questions or concerns, please don't hesitate to call        Steph Eric    ______________________________           _______________          _______________  Hospital Representative                              Date                                Time

## 2022-06-25 ENCOUNTER — HOSPITAL ENCOUNTER (EMERGENCY)
Facility: HOSPITAL | Age: 27
Discharge: HOME | End: 2022-06-25
Attending: EMERGENCY MEDICINE | Admitting: EMERGENCY MEDICINE
Payer: COMMERCIAL

## 2022-06-25 ENCOUNTER — APPOINTMENT (EMERGENCY)
Dept: RADIOLOGY | Facility: HOSPITAL | Age: 27
End: 2022-06-25
Payer: COMMERCIAL

## 2022-06-25 VITALS
DIASTOLIC BLOOD PRESSURE: 80 MMHG | HEART RATE: 70 BPM | BODY MASS INDEX: 29.16 KG/M2 | SYSTOLIC BLOOD PRESSURE: 121 MMHG | WEIGHT: 181.44 LBS | OXYGEN SATURATION: 100 % | TEMPERATURE: 98.5 F | RESPIRATION RATE: 16 BRPM | HEIGHT: 66 IN

## 2022-06-25 DIAGNOSIS — S20.212A CONTUSION OF RIB ON LEFT SIDE, INITIAL ENCOUNTER: Primary | ICD-10-CM

## 2022-06-25 LAB
BILIRUB UR QL STRIP: NEGATIVE
CLARITY UR: CLEAR
COLOR UR: YELLOW
EXT PREG TEST URINE: NEGATIVE
EXT. CONTROL ED NAV: NORMAL
GLUCOSE UR STRIP-MCNC: NEGATIVE MG/DL
HGB UR QL STRIP.AUTO: NEGATIVE
KETONES UR STRIP-MCNC: NEGATIVE MG/DL
LEUKOCYTE ESTERASE UR QL STRIP: NEGATIVE
NITRITE UR QL STRIP: NEGATIVE
PH UR STRIP.AUTO: 8.5 [PH] (ref 4.5–8)
PROT UR STRIP-MCNC: NEGATIVE MG/DL
SP GR UR STRIP.AUTO: 1.02 (ref 1–1.03)
UROBILINOGEN UR QL STRIP.AUTO: 0.2 E.U./DL

## 2022-06-25 PROCEDURE — 99285 EMERGENCY DEPT VISIT HI MDM: CPT | Performed by: PHYSICIAN ASSISTANT

## 2022-06-25 PROCEDURE — 96372 THER/PROPH/DIAG INJ SC/IM: CPT

## 2022-06-25 PROCEDURE — 81003 URINALYSIS AUTO W/O SCOPE: CPT

## 2022-06-25 PROCEDURE — 81025 URINE PREGNANCY TEST: CPT | Performed by: PHYSICIAN ASSISTANT

## 2022-06-25 PROCEDURE — 93005 ELECTROCARDIOGRAM TRACING: CPT

## 2022-06-25 PROCEDURE — 71101 X-RAY EXAM UNILAT RIBS/CHEST: CPT

## 2022-06-25 PROCEDURE — 99284 EMERGENCY DEPT VISIT MOD MDM: CPT

## 2022-06-25 RX ORDER — KETOROLAC TROMETHAMINE 30 MG/ML
15 INJECTION, SOLUTION INTRAMUSCULAR; INTRAVENOUS ONCE
Status: COMPLETED | OUTPATIENT
Start: 2022-06-25 | End: 2022-06-25

## 2022-06-25 RX ORDER — LIDOCAINE 40 MG/G
CREAM TOPICAL AS NEEDED
Qty: 30 G | Refills: 0 | Status: SHIPPED | OUTPATIENT
Start: 2022-06-25

## 2022-06-25 RX ADMIN — KETOROLAC TROMETHAMINE 15 MG: 30 INJECTION, SOLUTION INTRAMUSCULAR; INTRAVENOUS at 22:18

## 2022-06-26 LAB
ATRIAL RATE: 63 BPM
P AXIS: 49 DEGREES
PR INTERVAL: 162 MS
QRS AXIS: 68 DEGREES
QRSD INTERVAL: 84 MS
QT INTERVAL: 390 MS
QTC INTERVAL: 399 MS
T WAVE AXIS: 41 DEGREES
VENTRICULAR RATE: 63 BPM

## 2022-06-26 PROCEDURE — 93010 ELECTROCARDIOGRAM REPORT: CPT | Performed by: INTERNAL MEDICINE
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
## 2022-06-26 NOTE — DISCHARGE INSTRUCTIONS
Continue the tylenol and ibuprofen as directed  You may also use the cream for pain  Follow up with your doctor  Return to the ED for worsening symptoms

## 2022-06-28 ENCOUNTER — TELEPHONE (OUTPATIENT)
Dept: OTHER | Facility: OTHER | Age: 27
End: 2022-06-28

## 2022-06-28 NOTE — TELEPHONE ENCOUNTER
Patient called and is asking if the office can give her a call regarding her upcoming appointment with the office, patient is asking if she can get a soon date for her appointment

## 2022-07-01 ENCOUNTER — OFFICE VISIT (OUTPATIENT)
Dept: FAMILY MEDICINE CLINIC | Facility: CLINIC | Age: 27
End: 2022-07-01

## 2022-07-01 VITALS
HEIGHT: 66 IN | SYSTOLIC BLOOD PRESSURE: 118 MMHG | TEMPERATURE: 97.9 F | WEIGHT: 181.5 LBS | DIASTOLIC BLOOD PRESSURE: 82 MMHG | BODY MASS INDEX: 29.17 KG/M2
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
## 2022-07-01 DIAGNOSIS — M79.642 HAND PAIN, LEFT: ICD-10-CM

## 2022-07-01 DIAGNOSIS — M25.522 LEFT ELBOW PAIN: ICD-10-CM

## 2022-07-01 DIAGNOSIS — S56.912D FOREARM STRAIN, LEFT, SUBSEQUENT ENCOUNTER: ICD-10-CM
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
## 2022-07-01 PROCEDURE — 99203 OFFICE O/P NEW LOW 30 MIN: CPT | Performed by: FAMILY MEDICINE

## 2022-07-01 RX ORDER — PREDNISONE 10 MG/1
TABLET ORAL
Qty: 18 TABLET | Refills: 0 | Status: SHIPPED | OUTPATIENT
Start: 2022-07-01

## 2022-07-01 NOTE — PATIENT INSTRUCTIONS
Elbow Sprain   AMBULATORY CARE:   An elbow sprain  is caused by a stretched or torn ligament in the elbow joint  Ligaments are the strong tissues that connect bones  Common symptoms include the following:   Bruising or changes in skin color    Decreased arm movement    Pain and stiffness, especially with movement    Swelling and tenderness    Seek care immediately if:   The skin of your injured arm looks bluish or pale (less color than normal)  You have new or increased numbness in your injured arm  Call your doctor if:   You have increased swelling and pain in your elbow  You have new or increased stiffness or trouble moving your injured arm  You have questions or concerns about your condition or care  Treatment for an elbow sprain  may include a support device, such as a brace, sling, or splint  These devices limit movement and protect your joint  Treatment may also include pain medicine, physical therapy, or surgery if the ligament does not heal  Ask how to take pain medicine safely  Care for an elbow sprain:   Rest  your elbow for 1 to 2 days after your injury  This will help decrease the risk of more damage to your elbow  Avoid activities that cause pain  Return to normal activities as directed  Apply ice  on your elbow for 15 to 20 minutes every hour or as directed  Use an ice pack, or put crushed ice in a plastic bag  Cover it with a towel  Ice helps prevent tissue damage and decreases swelling and pain  Use an elastic bandage  to support your elbow and decrease swelling so it can heal  The elastic bandage should be snug but not tight  Follow instructions about how to apply your bandage  Elevate  your elbow above the level of your heart as often as you can  This will help decrease swelling and pain  Prop your arm on pillows or blankets to keep it elevated comfortably  Exercise  your elbow as directed to decrease stiffness and improve strength   You may be directed to exercise once you are able to move your arm without pain  Prevent another elbow sprain:   Make sure you warm up and stretch before you exercise  Do not exercise when you feel pain or you are tired  Wear equipment to protect yourself when you play sports  Stop exercising and playing sports if your symptoms from a past injury return  Follow up with your doctor as directed:  Write down your questions so you remember to ask them during your visits  © Copyright "[employer] 2022 Information is for End User's use only and may not be sold, redistributed or otherwise used for commercial purposes  All illustrations and images included in CareNotes® are the copyrighted property of [employer]  or [facility] Jolly Ng   The above information is an  only  It is not intended as medical advice for individual conditions or treatments  Talk to your doctor, nurse or pharmacist before following any medical regimen to see if it is safe and effective for you
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
## 2023-04-25 ENCOUNTER — HOSPITAL ENCOUNTER (EMERGENCY)
Facility: HOSPITAL | Age: 28
Discharge: HOME | End: 2023-04-25
Attending: EMERGENCY MEDICINE

## 2023-04-25 VITALS
WEIGHT: 176.59 LBS | HEART RATE: 66 BPM | DIASTOLIC BLOOD PRESSURE: 75 MMHG | OXYGEN SATURATION: 100 % | BODY MASS INDEX: 28.5 KG/M2 | RESPIRATION RATE: 18 BRPM | TEMPERATURE: 98 F | SYSTOLIC BLOOD PRESSURE: 129 MMHG

## 2023-04-25 DIAGNOSIS — M54.50 ACUTE LEFT-SIDED LOW BACK PAIN WITHOUT SCIATICA: Primary | ICD-10-CM

## 2023-04-25 RX ORDER — METHOCARBAMOL 500 MG/1
500 TABLET, FILM COATED ORAL 2 TIMES DAILY
Qty: 20 TABLET | Refills: 0 | Status: SHIPPED | OUTPATIENT
Start: 2023-04-25

## 2023-04-25 RX ORDER — DIAZEPAM 5 MG/1
5 TABLET ORAL ONCE
Status: COMPLETED | OUTPATIENT
Start: 2023-04-25 | End: 2023-04-25

## 2023-04-25 RX ORDER — LIDOCAINE 50 MG/G
1 PATCH TOPICAL ONCE
Status: DISCONTINUED | OUTPATIENT
Start: 2023-04-25 | End: 2023-04-25 | Stop reason: HOSPADM

## 2023-04-25 RX ORDER — ACETAMINOPHEN 500 MG
500 TABLET ORAL EVERY 6 HOURS PRN
Qty: 30 TABLET | Refills: 0 | Status: SHIPPED | OUTPATIENT
Start: 2023-04-25

## 2023-04-25 RX ORDER — IBUPROFEN 400 MG/1
400 TABLET ORAL EVERY 6 HOURS PRN
Qty: 30 TABLET | Refills: 0 | Status: SHIPPED | OUTPATIENT
Start: 2023-04-25

## 2023-04-25 RX ORDER — ACETAMINOPHEN 325 MG/1
650 TABLET ORAL ONCE
Status: COMPLETED | OUTPATIENT
Start: 2023-04-25 | End: 2023-04-25

## 2023-04-25 RX ORDER — KETOROLAC TROMETHAMINE 30 MG/ML
15 INJECTION, SOLUTION INTRAMUSCULAR; INTRAVENOUS ONCE
Status: COMPLETED | OUTPATIENT
Start: 2023-04-25 | End: 2023-04-25

## 2023-04-25 RX ADMIN — KETOROLAC TROMETHAMINE 15 MG: 30 INJECTION, SOLUTION INTRAMUSCULAR; INTRAVENOUS at 16:12

## 2023-04-25 RX ADMIN — LIDOCAINE 1 PATCH: 700 PATCH TOPICAL at 16:16

## 2023-04-25 RX ADMIN — DIAZEPAM 5 MG: 5 TABLET ORAL at 16:13

## 2023-04-25 RX ADMIN — ACETAMINOPHEN 325MG 650 MG: 325 TABLET ORAL at 16:12

## 2023-04-25 NOTE — Clinical Note
Claudia Colindres was seen and treated in our emergency department on 4/25/2023  Diagnosis:     Mili Moe  may return to work on return date  She may return on this date: 04/27/2023         If you have any questions or concerns, please don't hesitate to call        Val Caro PA-C    ______________________________           _______________          _______________  Hospital Representative                              Date                                Time

## 2023-04-25 NOTE — ED PROVIDER NOTES
History  Chief Complaint   Patient presents with   • Back Pain     Lower back pain starting today  Denies radiation of pain  Denies loss of bowel/bladder  Using a topical cream without relief  Stephani Player is a 32 y o  female w/ no significant PMHx presenting to the ED c/o L sided lower back pain which began approximately 1 hour ago while she was getting ready for work  Patient reports that she twisted her back while getting dressed and felt a muscle spasm in her L lower back  She denies any radiation of pain, numbness/weakness in legs, bowel/bladder incontinence, trauma, IV drug use, saddle anesthesia, fevers, or chills  Denies any chance of pregnancy  She reports that last year she had a similar muscle spasm which was successfully with Motrin, Tylenol, and muscle relaxers  Prior to Admission Medications   Prescriptions Last Dose Informant Patient Reported? Taking? Diclofenac Sodium (VOLTAREN) 1 %   No No   Sig: Apply 2 g topically 4 (four) times a day   acetaminophen (TYLENOL) 325 mg tablet   No No   Sig: Take 2 tablets (650 mg total) by mouth every 6 (six) hours as needed for mild pain   lidocaine (LMX) 4 % cream   No No   Sig: Apply topically as needed for mild pain   methocarbamol (ROBAXIN) 750 mg tablet   No No   Sig: Take 2 tablets (1,500 mg total) by mouth every 8 (eight) hours for 7 days   predniSONE 10 mg tablet   No No   Sig: 3 tablets for 3 days the 2 tablets for 3 days then 1 tablet daily for 3 days      Facility-Administered Medications: None       History reviewed  No pertinent past medical history  History reviewed  No pertinent surgical history  Family History   Problem Relation Age of Onset   • Cholelithiasis Mother    • Diabetes Maternal Grandmother    • Coronary artery disease Maternal Grandfather    • Diabetes Maternal Grandfather    • Diabetes Paternal Grandmother      I have reviewed and agree with the history as documented      E-Cigarette/Vaping   • E-Cigarette Use Never User      E-Cigarette/Vaping Substances     Social History     Tobacco Use   • Smoking status: Never   • Smokeless tobacco: Never   Vaping Use   • Vaping Use: Never used   Substance Use Topics   • Alcohol use: Yes     Comment: occassionally   • Drug use: No       Review of Systems   Constitutional: Negative for chills and fever  HENT: Negative for ear pain and sore throat  Eyes: Negative for pain and visual disturbance  Respiratory: Negative for cough and shortness of breath  Cardiovascular: Negative for chest pain and palpitations  Gastrointestinal: Negative for abdominal pain and vomiting  Genitourinary: Negative for dysuria and hematuria  Musculoskeletal: Positive for back pain  Negative for arthralgias  Skin: Negative for color change and rash  Neurological: Negative for seizures and syncope  All other systems reviewed and are negative  Physical Exam  Physical Exam  Vitals and nursing note reviewed  Constitutional:       General: She is not in acute distress  Appearance: She is well-developed  She is not ill-appearing, toxic-appearing or diaphoretic  HENT:      Head: Normocephalic and atraumatic  Eyes:      Conjunctiva/sclera: Conjunctivae normal    Cardiovascular:      Rate and Rhythm: Normal rate and regular rhythm  Heart sounds: No murmur heard  Pulmonary:      Effort: Pulmonary effort is normal  No respiratory distress  Breath sounds: Normal breath sounds  No stridor  No wheezing, rhonchi or rales  Abdominal:      Palpations: Abdomen is soft  Tenderness: There is no abdominal tenderness  Musculoskeletal:         General: No swelling  Cervical back: Neck supple  Back:    Skin:     General: Skin is warm and dry  Capillary Refill: Capillary refill takes less than 2 seconds  Neurological:      Mental Status: She is alert  GCS: GCS eye subscore is 4  GCS verbal subscore is 5  GCS motor subscore is 6        Motor: Motor function is intact  No weakness  Psychiatric:         Mood and Affect: Mood normal          Vital Signs  ED Triage Vitals [04/25/23 1553]   Temperature Pulse Respirations Blood Pressure SpO2   98 °F (36 7 °C) 66 18 129/75 100 %      Temp src Heart Rate Source Patient Position - Orthostatic VS BP Location FiO2 (%)   -- -- Sitting Right arm --      Pain Score       8           Vitals:    04/25/23 1553   BP: 129/75   Pulse: 66   Patient Position - Orthostatic VS: Sitting         Visual Acuity      ED Medications  Medications   acetaminophen (TYLENOL) tablet 650 mg (650 mg Oral Given 4/25/23 1612)   ketorolac (TORADOL) injection 15 mg (15 mg Intramuscular Given 4/25/23 1612)   diazepam (VALIUM) tablet 5 mg (5 mg Oral Given 4/25/23 1613)       Diagnostic Studies  Results Reviewed     None                 No orders to display              Procedures  Procedures         ED Course                               SBIRT 20yo+    Flowsheet Row Most Recent Value   Initial Alcohol Screen: US AUDIT-C     1  How often do you have a drink containing alcohol? 2 Filed at: 04/25/2023 1602   2  How many drinks containing alcohol do you have on a typical day you are drinking? 1 Filed at: 04/25/2023 1602   3a  Male UNDER 65: How often do you have five or more drinks on one occasion? 0 Filed at: 04/25/2023 1602   3b  FEMALE Any Age, or MALE 65+: How often do you have 4 or more drinks on one occassion? 0 Filed at: 04/25/2023 1602   Audit-C Score 3 Filed at: 04/25/2023 8689   CHATO: How many times in the past year have you    Used an illegal drug or used a prescription medication for non-medical reasons? Never Filed at: 04/25/2023 1602                    Medical Decision Making  Marzena Waggoner is a 32 y o  female w/ no significant PMHx presenting to the ED c/o L sided lower back pain which began approximately 1 hour ago while she was getting ready for work  Worse with twisting and bending  No red flag symptoms    On exam patient is well-appearing and in no acute distress  Vital signs are within normal limits  Physical exam reveals mild paraspinal left-sided muscular tenderness  No midline spinal tenderness, step-offs, deformity, or overlying skin changes  Discussed with patient that we will treat with anti-inflammatories and muscle relaxers in ER  Patient is agreeable  Will order Toradol, Lidoderm patch, and Valium  After receiving medications in ER, patient reports significant improvement of back pain  Suspect muscular strain  Sent prescription for Tylenol, Motrin, and Robaxin to pharmacy  Placed ambulatory referral to comprehensive spine program   Advised close follow-up with both spine program and PCP  Discussed appropriate supportive care at home including but not limited to appropriate dosing of medication sent to pharmacy, stretches, and use of heating pad  Advised strict return precautions to the ER  Patient is understanding and agreeable with plan  Acute left-sided low back pain without sciatica: acute illness or injury  Risk  OTC drugs  Prescription drug management  Disposition  Final diagnoses:   Acute left-sided low back pain without sciatica     Time reflects when diagnosis was documented in both MDM as applicable and the Disposition within this note     Time User Action Codes Description Comment    4/25/2023  4:48 PM La Osuna Add [M54 50] Acute left-sided low back pain without sciatica     4/25/2023  4:50 PM Priscilla Nurse [M54 50] Acute left-sided low back pain without sciatica       ED Disposition     ED Disposition   Discharge    Condition   Stable    Date/Time   Tue Apr 25, 2023  4:48 PM    Tasha Morrissey discharge to home/self care                 Follow-up Information     Follow up With Specialties Details Why Contact Info Additional [address] [facility], [facility]   [address] [facility] Emergency Department Emergency Medicine  If symptoms worsen [facility] [id number]  [address] [facility] Emergency Department, [address] , [facility], [place], [address]          Discharge Medication List as of 4/25/2023  4:50 PM      START taking these medications    Details   !! acetaminophen (TYLENOL) 500 mg tablet Take 1 tablet (500 mg total) by mouth every 6 (six) hours as needed for mild pain, Starting Tue 4/25/2023, Normal      ibuprofen (MOTRIN) 400 mg tablet Take 1 tablet (400 mg total) by mouth every 6 (six) hours as needed for mild pain, Starting Tue 4/25/2023, Normal       !! - Potential duplicate medications found  Please discuss with provider  CONTINUE these medications which have CHANGED    Details   methocarbamol (ROBAXIN) 500 mg tablet Take 1 tablet (500 mg total) by mouth 2 (two) times a day, Starting Tue 4/25/2023, Normal         CONTINUE these medications which have NOT CHANGED    Details   !! acetaminophen (TYLENOL) 325 mg tablet Take 2 tablets (650 mg total) by mouth every 6 (six) hours as needed for mild pain, Starting Thu 6/23/2022, Normal      Diclofenac Sodium (VOLTAREN) 1 % Apply 2 g topically 4 (four) times a day, Starting Mon 7/5/2021, Normal      lidocaine (LMX) 4 % cream Apply topically as needed for mild pain, Starting Sat 6/25/2022, Normal      predniSONE 10 mg tablet 3 tablets for 3 days the 2 tablets for 3 days then 1 tablet daily for 3 days, Normal       !! - Potential duplicate medications found  Please discuss with provider                PDMP Review     None          ED Provider  Electronically Signed by           Lauren Gage PA-C  04/25/23 1956

## 2023-04-26 ENCOUNTER — TELEPHONE (OUTPATIENT)
Dept: PHYSICAL THERAPY | Facility: OTHER | Age: 28
End: 2023-04-26

## 2023-04-26 NOTE — TELEPHONE ENCOUNTER
Call placed to the patient per [facility] referral     Unable to connect or leave a message  Patient was not accepting calls at the time  This is the 1st attempt to reach the patient  Will defer per protocol

## 2023-05-01 NOTE — TELEPHONE ENCOUNTER
Call placed to the patient per [facility] referral      Not accepting calls at the time  UNABLE TO LEAVE V/M      This is the 2nd attempt to reach the patient    Will defer per protocol

## 2023-07-20 ENCOUNTER — HOSPITAL ENCOUNTER (EMERGENCY)
Facility: HOSPITAL | Age: 28
Discharge: HOME | End: 2023-07-20
Attending: EMERGENCY MEDICINE
Payer: COMMERCIAL

## 2023-07-20 VITALS
WEIGHT: 178.13 LBS | TEMPERATURE: 98.3 F | SYSTOLIC BLOOD PRESSURE: 111 MMHG | BODY MASS INDEX: 28.75 KG/M2 | HEART RATE: 75 BPM | RESPIRATION RATE: 17 BRPM | OXYGEN SATURATION: 98 % | DIASTOLIC BLOOD PRESSURE: 80 MMHG

## 2023-07-20 DIAGNOSIS — S05.02XA CORNEAL ABRASION, LEFT, INITIAL ENCOUNTER: Primary | ICD-10-CM

## 2023-07-20 PROCEDURE — 99283 EMERGENCY DEPT VISIT LOW MDM: CPT

## 2023-07-20 PROCEDURE — 99284 EMERGENCY DEPT VISIT MOD MDM: CPT

## 2023-07-20 RX ORDER — ERYTHROMYCIN 5 MG/G
0.5 OINTMENT OPHTHALMIC ONCE
Status: COMPLETED | OUTPATIENT
Start: 2023-07-20 | End: 2023-07-20

## 2023-07-20 RX ORDER — ERYTHROMYCIN 5 MG/G
OINTMENT OPHTHALMIC
Qty: 1 G | Refills: 0 | Status: SHIPPED | OUTPATIENT
Start: 2023-07-20

## 2023-07-20 RX ORDER — TETRACAINE HYDROCHLORIDE 5 MG/ML
2 SOLUTION OPHTHALMIC ONCE
Status: COMPLETED | OUTPATIENT
Start: 2023-07-20 | End: 2023-07-20

## 2023-07-20 RX ADMIN — TETRACAINE HYDROCHLORIDE 2 DROP: 5 SOLUTION OPHTHALMIC at 03:56

## 2023-07-20 RX ADMIN — ERYTHROMYCIN 0.5 INCH: 5 OINTMENT OPHTHALMIC at 04:45

## 2023-07-20 RX ADMIN — FLUORESCEIN SODIUM 1 STRIP: 1 STRIP OPHTHALMIC at 03:56

## 2023-07-20 NOTE — DISCHARGE INSTRUCTIONS
Use erythromycin ointment four times a day for 5 days    Follow up with eye doctor ASAP as discussed    Return for any new/concerning symptoms such as vision loss, worsening eye pain, fever

## 2023-07-20 NOTE — Clinical Note
Farideh Castro was seen and treated in our emergency department on 7/20/2023. Diagnosis:     Jinel  . She may return on this date: If you have any questions or concerns, please don't hesitate to call.       Luiz Mahajan MD    ______________________________           _______________          _______________  Hospital Representative                              Date                                Time

## 2023-07-20 NOTE — ED PROVIDER NOTES
History  Chief Complaint   Patient presents with   • Eye Injury     Pt states "I was folding a piece of paper and it slashed me in my left eye, it got blurry, I tried washing it out with water but it just made it worse". Pt c/o blurry vision left eye     The patient is a 28-year-old female who presents to the ED for evaluation of left eye pain that began after she had accidentally scraped her eye with a piece of paper. She reports blurred vision in the eye following, and reports a foreign body sensation. She otherwise denies facial numbness, right eye symptoms, pain with eye movement. The patient does not wear contacts. Prior to Admission Medications   Prescriptions Last Dose Informant Patient Reported? Taking? Diclofenac Sodium (VOLTAREN) 1 %   No No   Sig: Apply 2 g topically 4 (four) times a day   acetaminophen (TYLENOL) 325 mg tablet   No No   Sig: Take 2 tablets (650 mg total) by mouth every 6 (six) hours as needed for mild pain   acetaminophen (TYLENOL) 500 mg tablet   No No   Sig: Take 1 tablet (500 mg total) by mouth every 6 (six) hours as needed for mild pain   ibuprofen (MOTRIN) 400 mg tablet   No No   Sig: Take 1 tablet (400 mg total) by mouth every 6 (six) hours as needed for mild pain   lidocaine (LMX) 4 % cream   No No   Sig: Apply topically as needed for mild pain   methocarbamol (ROBAXIN) 500 mg tablet   No No   Sig: Take 1 tablet (500 mg total) by mouth 2 (two) times a day   predniSONE 10 mg tablet   No No   Sig: 3 tablets for 3 days the 2 tablets for 3 days then 1 tablet daily for 3 days      Facility-Administered Medications: None       No past medical history on file. No past surgical history on file.     Family History   Problem Relation Age of Onset   • Cholelithiasis Mother    • Diabetes Maternal Grandmother    • Coronary artery disease Maternal Grandfather    • Diabetes Maternal Grandfather    • Diabetes Paternal Grandmother      I have reviewed and agree with the history as documented. E-Cigarette/Vaping   • E-Cigarette Use Never User      E-Cigarette/Vaping Substances     Social History     Tobacco Use   • Smoking status: Never   • Smokeless tobacco: Never   Vaping Use   • Vaping Use: Never used   Substance Use Topics   • Alcohol use: Yes     Comment: occassionally   • Drug use: No       Review of Systems   Constitutional: Negative for chills and fever. Eyes: Positive for pain and visual disturbance. Negative for itching. Respiratory: Negative for shortness of breath. Cardiovascular: Negative for chest pain. Skin: Negative for rash. Neurological: Negative for numbness and headaches. Physical Exam  Physical Exam  Vitals and nursing note reviewed. Constitutional:       General: She is not in acute distress. Appearance: She is well-developed. HENT:      Head: Normocephalic and atraumatic. Right Ear: External ear normal.      Left Ear: External ear normal.      Nose: Nose normal.      Mouth/Throat:      Mouth: Mucous membranes are moist.   Eyes:      General: Lids are normal. Lids are everted, no foreign bodies appreciated. Vision grossly intact. Gaze aligned appropriately. Extraocular Movements: Extraocular movements intact. Right eye: Normal extraocular motion. Left eye: Normal extraocular motion. Conjunctiva/sclera: Conjunctivae normal.      Right eye: Right conjunctiva is not injected. No chemosis. Left eye: Left conjunctiva is not injected. No chemosis. Pupils: Pupils are equal, round, and reactive to light. Left eye: Corneal abrasion and fluorescein uptake present. Kanwal exam negative. Comments: Corneal abrasion as pictured below    Cardiovascular:      Rate and Rhythm: Normal rate. Pulmonary:      Effort: Pulmonary effort is normal. No respiratory distress. Abdominal:      General: Abdomen is flat. Musculoskeletal:         General: Normal range of motion. Cervical back: Neck supple.    Skin: General: Skin is warm and dry. Neurological:      Mental Status: She is alert. Psychiatric:         Mood and Affect: Mood normal.             Vital Signs  ED Triage Vitals   Temperature Pulse Respirations Blood Pressure SpO2   07/20/23 0329 07/20/23 0330 07/20/23 0330 07/20/23 0330 07/20/23 0330   98.3 °F (36.8 °C) 75 17 111/80 98 %      Temp Source Heart Rate Source Patient Position - Orthostatic VS BP Location FiO2 (%)   07/20/23 0329 07/20/23 0330 07/20/23 0330 07/20/23 0330 --   Oral Monitor Sitting Right arm       Pain Score       --                  Vitals:    07/20/23 0330   BP: 111/80   Pulse: 75   Patient Position - Orthostatic VS: Sitting         Visual Acuity  Visual Acuity    Flowsheet Row Most Recent Value   Visual acuity R eye is 20/50   Visual acuity Left eye is 20/30   Visual acuity in both eyes is 20/40          ED Medications  Medications   erythromycin (ILOTYCIN) 0.5 % ophthalmic ointment 0.5 inch (has no administration in time range)   fluorescein sodium sterile ophthalmic strip 1 strip (1 strip Both Eyes Given by Other 7/20/23 0356)   tetracaine 0.5 % ophthalmic solution 2 drop (2 drops Both Eyes Given by Other 7/20/23 0356)       Diagnostic Studies  Results Reviewed     None                 No orders to display              Procedures  Procedures         ED Course         SBIRT 20yo+    Flowsheet Row Most Recent Value   Initial Alcohol Screen: US AUDIT-C     1. How often do you have a drink containing alcohol? 2 Filed at: 07/20/2023 0342   2. How many drinks containing alcohol do you have on a typical day you are drinking? 0 Filed at: 07/20/2023 0342   3a. Male UNDER 65: How often do you have five or more drinks on one occasion? 0 Filed at: 07/20/2023 0342   3b. FEMALE Any Age, or MALE 65+: How often do you have 4 or more drinks on one occassion? 0 Filed at: 07/20/2023 0342   Audit-C Score 2 Filed at: 07/20/2023 2804   CHATO: How many times in the past year have you. ..     Used an illegal drug or used a prescription medication for non-medical reasons? Never Filed at: 07/20/2023 8049      Medical Decision Making  DDx including but not limited to: conjunctivitis, corneal abrasion, corneal foreign body, iritis, uveitis, UV keratitis, periorbital cellulitis    Visual acuity intact. PERRLA. EOMI. Corneal abrasion as above noted with fluorescein stain. Will treat with erythromycin ointment, instructed close ophthalmology follow-up. At the time of discharge, the patient is in no acute distress. I discussed with the patient the diagnosis, treatment plan, follow-up, return precautions, and discharge instructions; they were given the opportunity to ask questions and verbalized understanding. Corneal abrasion, left, initial encounter: acute illness or injury  Amount and/or Complexity of Data Reviewed  External Data Reviewed: notes. Risk  Prescription drug management. Disposition  Final diagnoses:   Corneal abrasion, left, initial encounter     Time reflects when diagnosis was documented in both MDM as applicable and the Disposition within this note     Time User Action Codes Description Comment    7/20/2023  4:11 AM Barak Cerna Add [S05. 02XA] Corneal abrasion, left, initial encounter       ED Disposition     ED Disposition   Discharge    Condition   Stable    Date/Time   Thu Jul 20, 2023 [address] [place] [address] discharge to home/self care. Follow-up Information     Follow up With Specialties Details Why [address] [facility]    [address] [place] [address]  [phone]          Patient's Medications   Discharge Prescriptions    ERYTHROMYCIN (ILOTYCIN) OPHTHALMIC OINTMENT    Place a 1/2 inch ribbon of ointment into the lower eyelid. Start Date: 7/20/2023 End Date: --       Order Dose: --       Quantity: 1 g    Refills: 0       No discharge procedures on file.     PDMP Review     None          ED Provider  Electronically Signed by           Anthony Gonzalez PA-C  07/20/23 0422

## 2023-07-20 NOTE — Clinical Note
Karyn Blancas was seen and treated in our emergency department on 7/20/2023. No restrictions            Diagnosis:     Regina Zayas  is off the rest of the shift today, may return to work on return date. She may return on this date: 07/24/2023         If you have any questions or concerns, please don't hesitate to call.       Keila Moyer RN    ______________________________           _______________          _______________  Hospital Representative                              Date                                Time

## 2025-04-30 ENCOUNTER — TELEPHONE (OUTPATIENT)
Dept: FAMILY MEDICINE CLINIC | Facility: CLINIC | Age: 30
End: 2025-04-30